# Patient Record
Sex: FEMALE | Race: WHITE | NOT HISPANIC OR LATINO | Employment: UNEMPLOYED | RURAL
[De-identification: names, ages, dates, MRNs, and addresses within clinical notes are randomized per-mention and may not be internally consistent; named-entity substitution may affect disease eponyms.]

---

## 2022-03-07 DIAGNOSIS — Z98.890 S/P ACL RECONSTRUCTION: Primary | ICD-10-CM

## 2022-03-08 ENCOUNTER — CLINICAL SUPPORT (OUTPATIENT)
Dept: REHABILITATION | Facility: HOSPITAL | Age: 27
End: 2022-03-08
Payer: MEDICAID

## 2022-03-08 DIAGNOSIS — Z98.890 S/P ACL RECONSTRUCTION: ICD-10-CM

## 2022-03-08 DIAGNOSIS — R29.898 RIGHT LEG WEAKNESS: ICD-10-CM

## 2022-03-08 PROCEDURE — 97162 PT EVAL MOD COMPLEX 30 MIN: CPT
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
## 2022-03-17 ENCOUNTER — CLINICAL SUPPORT (OUTPATIENT)
Dept: REHABILITATION | Facility: HOSPITAL | Age: 27
End: 2022-03-17
Payer: MEDICAID

## 2022-03-17 DIAGNOSIS — Z98.890 S/P ACL RECONSTRUCTION: Primary | ICD-10-CM

## 2022-03-17 PROCEDURE — 97110 THERAPEUTIC EXERCISES: CPT | Mod: CQ

## 2022-03-17 PROCEDURE — 97530 THERAPEUTIC ACTIVITIES: CPT | Mod: CQ

## 2022-03-21 ENCOUNTER — CLINICAL SUPPORT (OUTPATIENT)
Dept: REHABILITATION | Facility: HOSPITAL | Age: 27
End: 2022-03-21
Payer: MEDICAID

## 2022-03-21 DIAGNOSIS — Z98.890 S/P ACL RECONSTRUCTION: Primary | ICD-10-CM

## 2022-03-21 PROCEDURE — 97530 THERAPEUTIC ACTIVITIES: CPT | Mod: CQ

## 2022-03-21 PROCEDURE — 97110 THERAPEUTIC EXERCISES: CPT | Mod: CQ
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
## 2022-04-01 ENCOUNTER — CLINICAL SUPPORT (OUTPATIENT)
Dept: REHABILITATION | Facility: HOSPITAL | Age: 27
End: 2022-04-01
Payer: MEDICAID

## 2022-04-01 DIAGNOSIS — Z98.890 S/P ACL RECONSTRUCTION: Primary | ICD-10-CM

## 2022-04-01 PROCEDURE — 97110 THERAPEUTIC EXERCISES: CPT | Mod: CQ

## 2022-04-01 PROCEDURE — 97116 GAIT TRAINING THERAPY: CPT | Mod: CQ

## 2022-04-01 NOTE — PROGRESS NOTES
Physical Therapy Treatment Note     Name: Marly Mendenhall  Clinic Number: [id number]    Therapy Diagnosis:   Encounter Diagnosis   Name Primary?    S/P ACL reconstruction Yes     Physician: Nicholas Tena III, MD    Visit Date: 4/1/2022    Physician Orders: Eval and treat for R knee; see media section.    Evaluation Date: 3/8/22  Authorization Period Expiration: 4/29/22      Visit # / Visits authorized: 4 / 18   PTA Visit #: 3        Precautions: WBAT RLE  Functional Level Prior to Evaluation: Independent    Subjective     Pt reports: I have been on my feet a lot and my knee swells more with increased activity.  She was compliant with home exercise program.  Response to previous treatment: none significant.  Functional change: I hate I have missed some therapy appointments; I have started a new job and hated to ask off to attend therapy.    Pain: 3/10 (0/10 reported prior to therex.)  Location: right knee, medial aspect.    Objective     Marly received therapeutic exercises to develop strength, endurance and ROM for 45 minutes including:  Exercises Day 2 Day 3 Day 4 Day 5  Day 6 Day 7  (Sup Visit) Day 8 Day 9 Day 10  Day 11 Day 12       Nu step [id number] iuf3m48tgvk ipg0x14ykde           heelslides x30 x30 x40           QS 17f3boae 93u1vynu 29p3ifup           SLR x30 x30 x30           SL hip abd x30 x30 x30           bike rkn1d70gpvz iqm5q31lxya wur5y52jnak           Knee to chest flexion stretch   1k25slef                         Gait          Forward step ups   6inch  2x10           Lateral step up and downs   6inch  x10/side                           Patient completed gait training tasks to improve functional mobility and safety for 8 minutes as listed above. BUE assist is req'd for stability and balance.        Home Exercises Provided and Patient Education Provided     Use of CP at home to mitigate pain and swelling.    Written Home Exercises Provided: Patient instructed to cont prior HEP.        Assessment       Marly Is progressing well towards her goals with steadily improving ROM.  Pt to apply CP at home.   Pt prognosis is Excellent.     Pt will continue to benefit from skilled outpatient physical therapy to address the deficits listed in the problem list box on initial evaluation, provide pt/family education and to maximize pt's level of independence in the home and community environment.     Pt's spiritual, cultural and educational needs considered and pt agreeable to plan of care and goals.     Anticipated barriers to physical therapy: none significant.    Goals:    Short term goals:  1) I HOME EXERCISE PROGRAM  2) Tolerate 60 min cont activity  3) decrease pain to <3/10     Long term goals:  ) Functional limitation 74/100 or better.  2) Normalize knee ACTIVE RANGE OF MOTION (progressing, currently 0 to 122** passively and -1 to 116* actively)  3) Independent step up/down on right x10    Plan     Plan of Care Dates: 3/8/22- 4/29/22  CPT codes and number of units allowed per visit:    35423 3xvisit, 18 visits = 54  84983 1xvisit, 18 visits =18  12403 1xvisit, 18 visits =18  17832 1xvisit, 18 visits =18  50721 1xvisit, 18 visits =18  81391 1xvisit, 18 visits =18      Outpatient Physical Therapy 3 times weekly for 6 weeks to include the following interventions: Electrical Stimulation supervised and ifc for pain, Gait Training, Manual Therapy, Moist Heat/ Ice, Therapeutic Activities and Therapeutic Exercise.          Joanne Bueno, PTA  4/1/2022

## 2022-04-04 ENCOUNTER — CLINICAL SUPPORT (OUTPATIENT)
Dept: REHABILITATION | Facility: HOSPITAL | Age: 27
End: 2022-04-04
Payer: MEDICAID

## 2022-04-04 DIAGNOSIS — Z98.890 S/P ACL RECONSTRUCTION: Primary | ICD-10-CM

## 2022-04-04 PROCEDURE — 97116 GAIT TRAINING THERAPY: CPT | Mod: CQ

## 2022-04-04 PROCEDURE — 97110 THERAPEUTIC EXERCISES: CPT | Mod: CQ

## 2022-04-04 NOTE — PROGRESS NOTES
Physical Therapy Treatment Note     Name: Marly Mendenhall  Clinic Number: [id number]    Therapy Diagnosis:   Encounter Diagnosis   Name Primary?    S/P ACL reconstruction Yes     Physician: Nicholas Tena III, MD    Visit Date: 4/4/2022    Physician Orders: Eval and treat for R knee; see media section.    Evaluation Date: 3/8/22  Authorization Period Expiration: 4/29/22      Visit # / Visits authorized: 5 / 18   PTA Visit #: 4        Precautions: WBAT RLE  Functional Level Prior to Evaluation: Independent    Subjective     Pt reports: My knee feels better and less tight after therapy.  She was compliant with home exercise program.  Response to previous treatment: muscle soreness in thigh.  Functional change: pain and weakness improving.    Pain: 2/10   Location: right knee, medial aspect.    Objective     Marly received therapeutic exercises to develop strength, endurance and ROM for 31 minutes including:  Exercises Day 2 Day 3 Day 4 Day 5  Day 6 Day 7  (Sup Visit) Day 8 Day 9 Day 10  Day 11 Day 12       Nu step hoi7x57wxkg vpy2j14zyvo goj8x31qpzb tni7g10nieo          heelslides x30 x30 x40 x40          QS 50a2zyig 30x3zjto 52x7wlqi 83a3jfwr          SLR x30 x30 x30 x40          SL hip abd x30 x30 x30 x30          bike hnu0t91xwgd wui6d92bain gdr8j52zrxg kfk5m27anmu          Knee to chest flexion stretch   4m91zerv 0j72bvrp                        Gait          Forward step ups   6inch  2x10 6inch  2x10          Lateral step up and downs   6inch  x10/side 6inch  x10/side                          Patient completed gait training tasks to improve functional mobility and safety for 8 minutes as listed above. BUE assist is req'd for stability and balance.        Home Exercises Provided and Patient Education Provided     Use of CP at home to mitigate pain and swelling.    Written Home Exercises Provided: Patient instructed to cont prior HEP.       Assessment       Marly Is progressing well towards her goals with  steady progression of task difficulty.  Pt to apply CP at home.   Pt prognosis is Excellent.     Pt will continue to benefit from skilled outpatient physical therapy to address the deficits listed in the problem list box on initial evaluation, provide pt/family education and to maximize pt's level of independence in the home and community environment.     Pt's spiritual, cultural and educational needs considered and pt agreeable to plan of care and goals.     Anticipated barriers to physical therapy: none significant.    Goals:    Short term goals:  1) I HOME EXERCISE PROGRAM  2) Tolerate 60 min cont activity  3) decrease pain to <3/10     Long term goals:  ) Functional limitation 74/100 or better.  2) Normalize knee ACTIVE RANGE OF MOTION (progressing, currently 0 to 122** passively and -1 to 116* actively)  3) Independent step up/down on right x10    Plan     Plan of Care Dates: 3/8/22- 4/29/22  CPT codes and number of units allowed per visit:    31346 3xvisit, 18 visits = 54  53628 1xvisit, 18 visits =18  05602 1xvisit, 18 visits =18  73028 1xvisit, 18 visits =18  05008 1xvisit, 18 visits =18  53037 1xvisit, 18 visits =18      Outpatient Physical Therapy 3 times weekly for 6 weeks to include the following interventions: Electrical Stimulation supervised and ifc for pain, Gait Training, Manual Therapy, Moist Heat/ Ice, Therapeutic Activities and Therapeutic Exercise.      Delayed check in time due to  issues.    Joanne Bueno, PTA  4/4/2022

## 2022-04-05 ENCOUNTER — CLINICAL SUPPORT (OUTPATIENT)
Dept: REHABILITATION | Facility: HOSPITAL | Age: 27
End: 2022-04-05
Payer: MEDICAID

## 2022-04-05 DIAGNOSIS — Z98.890 S/P ACL RECONSTRUCTION: Primary | ICD-10-CM

## 2022-04-05 PROCEDURE — 97110 THERAPEUTIC EXERCISES: CPT | Mod: CQ

## 2022-04-05 PROCEDURE — 97116 GAIT TRAINING THERAPY: CPT | Mod: CQ

## 2022-04-05 NOTE — PROGRESS NOTES
Physical Therapy Treatment Note     Name: Marly Mendenhall  Clinic Number: [id number]    Therapy Diagnosis:   Encounter Diagnosis   Name Primary?    S/P ACL reconstruction Yes     Physician: Nicholas Tena III, MD    Visit Date: 4/5/2022    Physician Orders: Eval and treat for R knee; see media section.    Evaluation Date: 3/8/22  Authorization Period Expiration: 4/29/22      Visit # / Visits authorized: 6 / 18   PTA Visit #: 5        Precautions: WBAT RLE  Functional Level Prior to Evaluation: Independent    Subjective     Pt reports: My knee feels better and less tight after therapy.  She was compliant with home exercise program.  Response to previous treatment: improving muscle soreness in thigh.  Functional change: pain and weakness improving.    Pain: 2/10   Location: right knee, medial aspect.    Objective     Marly received therapeutic exercises to develop strength, endurance and ROM for 32 minutes including:  Exercises Day 2 Day 3 Day 4 Day 5  Day 6 Day 7  (Sup Visit) Day 8 Day 9 Day 10  Day 11 Day 12       Nu step wep4b46bqaz hke5b19cjeq vdk2c24bshx uep3n95vfnt nmd4i05plzy         heelslides x30 x30 x40 x40 x40         QS 90o4vxhf 54j1lxau 65y0mbuq 28c2xqek 42v1agbm         SLR x30 x30 x30 x40 x40         SL hip abd x30 x30 x30 x30 x30         bike whk2g77srnt wxy1y31dmrh jkg1q12begm cdj0v54yets kjq3s58ymnp         Knee to chest flexion stretch   9g25lgwm 7u30qvpp 2j90xbhy                       Gait          Forward step ups   6inch  2x10 6inch  2x10 6inch  2x10         Lateral step up and downs   6inch  x10/side 6inch  x10/side 6inch  x12/side                         Patient completed gait training tasks to improve functional mobility and safety for 8 minutes as listed above. BUE assist is req'd for stability and balance.          Written Home Exercises Provided: Patient instructed to cont prior HEP.       Assessment       Marly Is progressing well towards her goals with steady progression of task  difficulty.  Pt to apply CP at home.   Pt prognosis is Excellent.     Pt will continue to benefit from skilled outpatient physical therapy to address the deficits listed in the problem list box on initial evaluation, provide pt/family education and to maximize pt's level of independence in the home and community environment.     Pt's spiritual, cultural and educational needs considered and pt agreeable to plan of care and goals.     Anticipated barriers to physical therapy: none significant.    Goals:    Short term goals:  1) I HOME EXERCISE PROGRAM  2) Tolerate 60 min cont activity  3) decrease pain to <3/10     Long term goals:  ) Functional limitation 74/100 or better.  2) Normalize knee ACTIVE RANGE OF MOTION (progressing, currently 0 to 122** passively and -1 to 116* actively)  3) Independent step up/down on right x10    Plan     Plan of Care Dates: 3/8/22- 4/29/22  CPT codes and number of units allowed per visit:    32706 3xvisit, 18 visits = 54  39112 1xvisit, 18 visits =18  70599 1xvisit, 18 visits =18  22189 1xvisit, 18 visits =18  36181 1xvisit, 18 visits =18  56479 1xvisit, 18 visits =18      Outpatient Physical Therapy 3 times weekly for 6 weeks to include the following interventions: Electrical Stimulation supervised and ifc for pain, Gait Training, Manual Therapy, Moist Heat/ Ice, Therapeutic Activities and Therapeutic Exercise.          Joanne Bueno, PTA  4/5/2022

## 2022-10-18 ENCOUNTER — HOSPITAL ENCOUNTER (EMERGENCY)
Facility: HOSPITAL | Age: 27
Discharge: HOME | End: 2022-10-18
Payer: MEDICAID

## 2022-10-18 VITALS
SYSTOLIC BLOOD PRESSURE: 124 MMHG | HEIGHT: 62 IN | RESPIRATION RATE: 18 BRPM | WEIGHT: 148 LBS | OXYGEN SATURATION: 99 % | TEMPERATURE: 99 F | HEART RATE: 97 BPM | DIASTOLIC BLOOD PRESSURE: 83 MMHG | BODY MASS INDEX: 27.23 KG/M2

## 2022-10-18 DIAGNOSIS — S01.81XA FACIAL LACERATION, INITIAL ENCOUNTER: Primary | ICD-10-CM
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
## 2022-10-18 DIAGNOSIS — S01.512A LACERATION OF ORAL CAVITY WITHOUT FOREIGN BODY, INITIAL ENCOUNTER: ICD-10-CM

## 2022-10-18 PROCEDURE — 90471 IMMUNIZATION ADMIN: CPT | Performed by: NURSE PRACTITIONER

## 2022-10-18 PROCEDURE — 99284 EMERGENCY DEPT VISIT MOD MDM: CPT

## 2022-10-18 PROCEDURE — 63600175 PHARM REV CODE 636 W HCPCS: Performed by: NURSE PRACTITIONER

## 2022-10-18 PROCEDURE — 25000003 PHARM REV CODE 250: Performed by: NURSE PRACTITIONER

## 2022-10-18 PROCEDURE — 99284 PR EMERGENCY DEPT VISIT,LEVEL IV: ICD-10-PCS | Mod: ,,, | Performed by: NURSE PRACTITIONER

## 2022-10-18 PROCEDURE — 90715 TDAP VACCINE 7 YRS/> IM: CPT | Performed by: NURSE PRACTITIONER

## 2022-10-18 PROCEDURE — 99284 EMERGENCY DEPT VISIT MOD MDM: CPT | Mod: ,,, | Performed by: NURSE PRACTITIONER

## 2022-10-18 PROCEDURE — 96372 THER/PROPH/DIAG INJ SC/IM: CPT

## 2022-10-18 RX ORDER — GABAPENTIN 100 MG/1
100 CAPSULE ORAL EVERY MORNING
COMMUNITY
Start: 2022-09-02

## 2022-10-18 RX ORDER — KETOROLAC TROMETHAMINE 30 MG/ML
30 INJECTION, SOLUTION INTRAMUSCULAR; INTRAVENOUS
Status: COMPLETED | OUTPATIENT
Start: 2022-10-18 | End: 2022-10-18

## 2022-10-18 RX ORDER — AMOXICILLIN 875 MG/1
875 TABLET, FILM COATED ORAL EVERY 12 HOURS
Qty: 14 TABLET | Refills: 0 | Status: SHIPPED | OUTPATIENT
Start: 2022-10-18 | End: 2022-10-25

## 2022-10-18 RX ADMIN — KETOROLAC TROMETHAMINE 30 MG: 30 INJECTION, SOLUTION INTRAMUSCULAR; INTRAVENOUS at 04:10

## 2022-10-18 RX ADMIN — TETANUS TOXOID, REDUCED DIPHTHERIA TOXOID AND ACELLULAR PERTUSSIS VACCINE, ADSORBED 0.5 ML: 5; 2.5; 8; 8; 2.5 SUSPENSION INTRAMUSCULAR at 04:10

## 2022-10-18 RX ADMIN — BACITRACIN ZINC, NEOMYCIN, POLYMYXIN B 1 EACH: 400; 3.5; 5 OINTMENT TOPICAL at 04:10

## 2022-10-18 NOTE — DISCHARGE INSTRUCTIONS
Follow up with dentist.  Brush teeth after each meal or snack.  Complete full course of antibiotics.  Cleanse wound 2 times daily with mild soap and warm water.  May apply neosporin after each cleaning.   Ice to area 20 minutes every 2 hours while awake.  Return to emergency department for any new or worsening symptoms.
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
